# Patient Record
Sex: FEMALE | Race: BLACK OR AFRICAN AMERICAN | Employment: STUDENT | URBAN - METROPOLITAN AREA
[De-identification: names, ages, dates, MRNs, and addresses within clinical notes are randomized per-mention and may not be internally consistent; named-entity substitution may affect disease eponyms.]

---

## 2019-01-25 ENCOUNTER — HOSPITAL ENCOUNTER (OUTPATIENT)
Dept: LAB | Age: 19
Discharge: HOME | End: 2019-01-25
Payer: COMMERCIAL

## 2019-01-25 ENCOUNTER — OFFICE VISIT (OUTPATIENT)
Dept: FAMILY MEDICINE CLINIC | Age: 19
End: 2019-01-25

## 2019-01-25 VITALS
WEIGHT: 196 LBS | TEMPERATURE: 97.2 F | OXYGEN SATURATION: 99 % | BODY MASS INDEX: 31.5 KG/M2 | HEIGHT: 66 IN | DIASTOLIC BLOOD PRESSURE: 83 MMHG | RESPIRATION RATE: 16 BRPM | HEART RATE: 79 BPM | SYSTOLIC BLOOD PRESSURE: 119 MMHG

## 2019-01-25 DIAGNOSIS — R10.13 EPIGASTRIC ABDOMINAL PAIN: Primary | ICD-10-CM

## 2019-01-25 DIAGNOSIS — R10.13 EPIGASTRIC ABDOMINAL PAIN: ICD-10-CM

## 2019-01-25 LAB
ALBUMIN SERPL-MCNC: 4.2 G/DL (ref 3.4–5)
ALBUMIN/GLOB SERPL: 1.1 {RATIO} (ref 0.8–1.7)
ALP SERPL-CCNC: 48 U/L (ref 45–117)
ALT SERPL-CCNC: 19 U/L (ref 13–56)
AMYLASE SERPL-CCNC: 122 U/L (ref 25–115)
ANION GAP SERPL CALC-SCNC: 6 MMOL/L (ref 3–18)
AST SERPL-CCNC: 14 U/L (ref 15–37)
BILIRUB SERPL-MCNC: 0.5 MG/DL (ref 0.2–1)
BUN SERPL-MCNC: 12 MG/DL (ref 7–18)
BUN/CREAT SERPL: 13 (ref 12–20)
CALCIUM SERPL-MCNC: 9.3 MG/DL (ref 8.5–10.1)
CHLORIDE SERPL-SCNC: 101 MMOL/L (ref 100–108)
CO2 SERPL-SCNC: 32 MMOL/L (ref 21–32)
CREAT SERPL-MCNC: 0.94 MG/DL (ref 0.6–1.3)
GLOBULIN SER CALC-MCNC: 4 G/DL (ref 2–4)
GLUCOSE SERPL-MCNC: 95 MG/DL (ref 74–99)
LIPASE SERPL-CCNC: 411 U/L (ref 73–393)
POTASSIUM SERPL-SCNC: 3.9 MMOL/L (ref 3.5–5.5)
PROT SERPL-MCNC: 8.2 G/DL (ref 6.4–8.2)
SODIUM SERPL-SCNC: 139 MMOL/L (ref 136–145)

## 2019-01-25 PROCEDURE — 83013 H PYLORI (C-13) BREATH: CPT

## 2019-01-25 PROCEDURE — 83690 ASSAY OF LIPASE: CPT

## 2019-01-25 PROCEDURE — 80053 COMPREHEN METABOLIC PANEL: CPT

## 2019-01-25 PROCEDURE — 82150 ASSAY OF AMYLASE: CPT

## 2019-01-25 RX ORDER — BUPROPION HYDROCHLORIDE 150 MG/1
TABLET ORAL
Refills: 3 | COMMUNITY
Start: 2019-01-04

## 2019-01-25 RX ORDER — PANTOPRAZOLE SODIUM 40 MG/1
40 TABLET, DELAYED RELEASE ORAL DAILY
Qty: 30 TAB | Refills: 1 | Status: SHIPPED | OUTPATIENT
Start: 2019-01-25 | End: 2019-02-13 | Stop reason: ALTCHOICE

## 2019-01-25 NOTE — PROGRESS NOTES
HISTORY OF PRESENT ILLNESS  Haleigh Kurtz is a 19 y.o. female. HPI  1) Ms. Babatunde Griggs presents as a new patient for epigastric abdominal pain, present x 2 months. Pain is constant but worse with hunger. Eating does not tend to affect the pain. No OTC therapies tried for the pain. - She takes OTC garcinia-cambogia for use as an appetite-suppressant over the past year. -  LMP: 1/12/19     2) Hx depression - follows with La Nena Maki of [facility]. She has been on the Wellbutrin XL for ~7 months, doing well. Review of Systems   Gastrointestinal: Positive for abdominal pain, constipation and nausea. Negative for blood in stool, diarrhea, heartburn and vomiting. Psychiatric/Behavioral: Positive for depression (controlled). Visit Vitals  /83 (BP 1 Location: Right arm, BP Patient Position: Sitting)   Pulse 79   Temp 97.2 °F (36.2 °C) (Oral)   Resp 16   Ht 5' 6\" (1.676 m)   Wt 196 lb (88.9 kg)   LMP 01/12/2019 (Exact Date)   SpO2 99%   BMI 31.64 kg/m²       Physical Exam   Constitutional: She is oriented to person, place, and time. She appears well-developed and well-nourished. No distress. HENT:   Head: Normocephalic and atraumatic. Right Ear: Tympanic membrane, external ear and ear canal normal.   Left Ear: Tympanic membrane, external ear and ear canal normal.   Nose: Nose normal.   Mouth/Throat: Uvula is midline, oropharynx is clear and moist and mucous membranes are normal. No oropharyngeal exudate, posterior oropharyngeal edema, posterior oropharyngeal erythema or tonsillar abscesses. Eyes: Conjunctivae are normal. Pupils are equal, round, and reactive to light. No scleral icterus. Neck: Neck supple. Cardiovascular: Normal rate, regular rhythm and normal heart sounds. Exam reveals no gallop. No murmur heard. Pulses:       Dorsalis pedis pulses are 2+ on the right side, and 2+ on the left side.         Posterior tibial pulses are 2+ on the right side, and 2+ on the left side.   No pedal edema. Pulmonary/Chest: Effort normal and breath sounds normal. No respiratory distress. She has no decreased breath sounds. She has no wheezes. She has no rhonchi. She has no rales. Abdominal: Soft. Normal appearance and bowel sounds are normal. She exhibits no distension and no mass. There is no splenomegaly or hepatomegaly. There is tenderness (primarily epigastrum) in the right upper quadrant, epigastric area and left upper quadrant. There is no rigidity, no rebound, no guarding and negative Mathews's sign. Lymphadenopathy:        Head (right side): No submandibular and no tonsillar adenopathy present. Head (left side): No submandibular and no tonsillar adenopathy present. She has no cervical adenopathy. Right: No supraclavicular adenopathy present. Left: No supraclavicular adenopathy present. Neurological: She is alert and oriented to person, place, and time. Skin: Skin is warm and dry. Psychiatric: She has a normal mood and affect. Her speech is normal.       ASSESSMENT and PLAN  Orders Placed This Encounter    AMYLASE     Standing Status:   Future     Standing Expiration Date:   1/26/2020    LIPASE     Standing Status:   Future     Standing Expiration Date:   1/26/2020    H. PYLORI BREATH TEST     Standing Status:   Future     Standing Expiration Date:   3/13/3320    METABOLIC PANEL, COMPREHENSIVE     Standing Status:   Future     Standing Expiration Date:   1/26/2020    pantoprazole (PROTONIX) 40 mg tablet     Sig: Take 1 Tab by mouth daily. Dispense:  30 Tab     Refill:  1     Diagnoses and all orders for this visit:    1. Epigastric abdominal pain  -     AMYLASE; Future  -     LIPASE; Future  -     H. PYLORI BREATH TEST; Future  -     METABOLIC PANEL, COMPREHENSIVE; Future  -     pantoprazole (PROTONIX) 40 mg tablet; Take 1 Tab by mouth daily.  - Trial of PPI therapy x 1 month. If no improvement of pain, consider US gallbladder.        Follow-up Disposition:  Return in about 4 weeks (around 2/22/2019) for follow up abdominal pain. patient advised to come in sooner if pain worsens on medication.

## 2019-01-25 NOTE — PROGRESS NOTES
Rm 7  Patient presents to the clinic c/o abdominal pain on RUQ x a couple of months. Does patient want flu shot? No,already received. Depression Screening:  PHQ over the last two weeks 1/25/2019   Little interest or pleasure in doing things Not at all   Feeling down, depressed, irritable, or hopeless More than half the days   Total Score PHQ 2 2       Learning Assessment:  Learning Assessment 1/25/2019   PRIMARY LEARNER Patient   HIGHEST LEVEL OF EDUCATION - PRIMARY LEARNER  SOME COLLEGE   BARRIERS PRIMARY LEARNER NONE   CO-LEARNER CAREGIVER No   PRIMARY LANGUAGE ENGLISH   LEARNER PREFERENCE PRIMARY DEMONSTRATION   ANSWERED BY pateint   RELATIONSHIP SELF       Abuse Screening:  No flowsheet data found. Health Maintenance reviewed and discussed per provider: yes     Coordination of Care:    1. Have you been to the ER, urgent care clinic since your last visit? Hospitalized since your last visit? no    2. Have you seen or consulted any other health care providers outside of the [address] [place] [address] since your last visit? Include any pap smears or colon screening.  No

## 2019-01-27 LAB — UREA BREATH TEST QL: NEGATIVE

## 2019-02-13 ENCOUNTER — OFFICE VISIT (OUTPATIENT)
Dept: FAMILY MEDICINE CLINIC | Age: 19
End: 2019-02-13

## 2019-02-13 VITALS
HEART RATE: 95 BPM | HEIGHT: 66 IN | DIASTOLIC BLOOD PRESSURE: 74 MMHG | TEMPERATURE: 97.9 F | RESPIRATION RATE: 20 BRPM | OXYGEN SATURATION: 98 % | BODY MASS INDEX: 29.83 KG/M2 | SYSTOLIC BLOOD PRESSURE: 123 MMHG | WEIGHT: 185.6 LBS

## 2019-02-13 DIAGNOSIS — R10.13 EPIGASTRIC ABDOMINAL PAIN: ICD-10-CM

## 2019-02-13 DIAGNOSIS — R10.11 RUQ ABDOMINAL PAIN: Primary | ICD-10-CM

## 2019-02-13 RX ORDER — RANITIDINE 150 MG/1
150 TABLET, FILM COATED ORAL 2 TIMES DAILY
Qty: 60 TAB | Refills: 0 | Status: SHIPPED | OUTPATIENT
Start: 2019-02-13 | End: 2019-03-15 | Stop reason: SDUPTHER

## 2019-02-13 NOTE — PROGRESS NOTES
Rm 7  Patient presents to the clinic for a 4 week follow-up of abdominal pain. Patient stated after taking Protonix threw up and has hd constipation so stopped taking medication, but still does have abdominal pain. Depression Screening:  3 most recent PHQ Screens 2/13/2019 1/25/2019   Little interest or pleasure in doing things Not at all Not at all   Feeling down, depressed, irritable, or hopeless More than half the days More than half the days   Total Score PHQ 2 2 2       Learning Assessment:  Learning Assessment 1/25/2019   PRIMARY LEARNER Patient   HIGHEST LEVEL OF EDUCATION - PRIMARY LEARNER  SOME COLLEGE   BARRIERS PRIMARY LEARNER NONE   CO-LEARNER CAREGIVER No   PRIMARY LANGUAGE ENGLISH   LEARNER PREFERENCE PRIMARY DEMONSTRATION   ANSWERED BY pateint   RELATIONSHIP SELF       Abuse Screening:  No flowsheet data found. Health Maintenance reviewed and discussed per provider: yes     Coordination of Care:    1. Have you been to the ER, urgent care clinic since your last visit? Hospitalized since your last visit? no    2. Have you seen or consulted any other health care providers outside of the [address] [place] [address] since your last visit? Include any pap smears or colon screening.  No

## 2019-02-13 NOTE — PROGRESS NOTES
HISTORY OF PRESENT ILLNESS  Ruben Hobson is a 19 y.o. female. HPI  Ms. Blanchard Kussmaul presents for follow up epigastric and RUQ abdominal pain. She c/o N/V with the Protonix, so she discontinued this medication. She has improved her diet and actually lost 11 lbs since her last visit, but she c/o continued abdominal pain. She continues to feel pain is worse with hunger, and she admits that fattier meals also tend to trigger her pain. She also c/o constipation this week. Review of Systems   Gastrointestinal: Positive for constipation. Visit Vitals  /74 (BP 1 Location: Right arm, BP Patient Position: Sitting)   Pulse 95   Temp 97.9 °F (36.6 °C) (Oral)   Resp 20   Ht 5' 6\" (1.676 m)   Wt 185 lb 9.6 oz (84.2 kg)   LMP 01/14/2019 (Exact Date)   SpO2 98%   BMI 29.96 kg/m²     Patient's last menstrual period was 01/14/2019 (exact date). Physical Exam   Constitutional: She is oriented to person, place, and time. She appears well-developed and well-nourished. No distress. HENT:   Head: Normocephalic and atraumatic. Right Ear: Tympanic membrane, external ear and ear canal normal.   Left Ear: Tympanic membrane, external ear and ear canal normal.   Nose: Nose normal.   Mouth/Throat: Uvula is midline, oropharynx is clear and moist and mucous membranes are normal. No oropharyngeal exudate, posterior oropharyngeal edema, posterior oropharyngeal erythema or tonsillar abscesses. Eyes: Conjunctivae are normal. Pupils are equal, round, and reactive to light. No scleral icterus. Neck: Neck supple. Cardiovascular: Normal rate, regular rhythm and normal heart sounds. Exam reveals no gallop. No murmur heard. Pulses:       Dorsalis pedis pulses are 2+ on the right side, and 2+ on the left side. Posterior tibial pulses are 2+ on the right side, and 2+ on the left side. No pedal edema. Pulmonary/Chest: Effort normal and breath sounds normal. No respiratory distress. She has no decreased breath sounds. She has no wheezes. She has no rhonchi. She has no rales. Abdominal: Soft. Normal appearance and bowel sounds are normal. She exhibits no distension. There is tenderness in the right upper quadrant and epigastric area. There is no rigidity, no rebound, no guarding and negative Mathews's sign. Lymphadenopathy:        Head (right side): No submandibular and no tonsillar adenopathy present. Head (left side): No submandibular and no tonsillar adenopathy present. She has no cervical adenopathy. Right: No supraclavicular adenopathy present. Left: No supraclavicular adenopathy present. Neurological: She is alert and oriented to person, place, and time. Skin: Skin is warm and dry. Psychiatric: She has a normal mood and affect. Her speech is normal.       ASSESSMENT and PLAN  Orders Placed This Encounter    US GALLBLADDER     Standing Status:   Future     Standing Expiration Date:   3/13/2020     Order Specific Question:   Is Patient Pregnant? Answer:   No     Order Specific Question:   Reason for Exam     Answer:   RUQ abdominal pain    NM HEPATOBILIARY DUCT SCAN     With ejection fraction     Standing Status:   Future     Standing Expiration Date:   3/13/2020     Scheduling Instructions: With ejection fraction     Order Specific Question:   Is Patient Pregnant? Answer:   No    raNITIdine (ZANTAC) 150 mg tablet     Sig: Take 1 Tab by mouth two (2) times a day. Dispense:  60 Tab     Refill:  0     Diagnoses and all orders for this visit:    1. RUQ abdominal pain  -     US GALLBLADDER; Future  -     NM HEPATOBILIARY DUCT SCAN; Future    2. Epigastric abdominal pain  -     raNITIdine (ZANTAC) 150 mg tablet; Take 1 Tab by mouth two (2) times a day. Follow-up Disposition:  Return for follow-up results.

## 2019-03-15 DIAGNOSIS — R10.13 EPIGASTRIC ABDOMINAL PAIN: ICD-10-CM

## 2019-03-18 RX ORDER — RANITIDINE 150 MG/1
TABLET, FILM COATED ORAL
Qty: 60 TAB | Refills: 0 | Status: SHIPPED | OUTPATIENT
Start: 2019-03-18

## 2019-03-25 ENCOUNTER — HOSPITAL ENCOUNTER (OUTPATIENT)
Dept: NUCLEAR MEDICINE | Age: 19
Discharge: HOME | End: 2019-03-25
Attending: PHYSICIAN ASSISTANT
Payer: COMMERCIAL

## 2019-03-25 ENCOUNTER — HOSPITAL ENCOUNTER (OUTPATIENT)
Dept: ULTRASOUND IMAGING | Age: 19
Discharge: HOME | End: 2019-03-25
Attending: PHYSICIAN ASSISTANT
Payer: COMMERCIAL

## 2019-03-25 VITALS — BODY MASS INDEX: 28.41 KG/M2 | WEIGHT: 176 LBS

## 2019-03-25 DIAGNOSIS — R10.11 RUQ ABDOMINAL PAIN: ICD-10-CM

## 2019-03-25 DIAGNOSIS — R10.11 RUQ PAIN: ICD-10-CM

## 2019-03-25 DIAGNOSIS — K82.8 BILIARY DYSKINESIA: Primary | ICD-10-CM

## 2019-03-25 PROCEDURE — 76705 ECHO EXAM OF ABDOMEN: CPT

## 2019-03-25 PROCEDURE — 78227 HEPATOBIL SYST IMAGE W/DRUG: CPT

## 2019-03-25 PROCEDURE — 74011250636 HC RX REV CODE- 250/636: Performed by: PHYSICIAN ASSISTANT

## 2019-03-25 RX ADMIN — SINCALIDE 5 MCG: 5 INJECTION, POWDER, LYOPHILIZED, FOR SOLUTION INTRAVENOUS at 10:09

## 2019-03-25 NOTE — PROGRESS NOTES
Please notify Ms. David Servant that her gallbladder US was normal, but the other scan showed a mild decrease in the function of her gallbladder. I have referred her to a surgeon to get a recommendation on this.

## 2019-03-25 NOTE — PROGRESS NOTES
Called patient to inform her PA Candy Hernandez reviewed her ultrasound results and it indicated it was normal but there is mild decrease in the function of the gallbladder. Patient was advised a referral was placed to the surgeon for further recommendation. Patient verbalized understanding and had no further questions.

## 2019-03-25 NOTE — PROGRESS NOTES
Please notify Ms. Leela Betancourt that her gallbladder US was normal, but the other scan showed a mild decrease in the function of her gallbladder. I have referred her to a surgeon to get a recommendation on this.

## 2019-04-03 ENCOUNTER — OFFICE VISIT (OUTPATIENT)
Dept: SURGERY | Age: 19
End: 2019-04-03

## 2019-04-03 VITALS
BODY MASS INDEX: 30.37 KG/M2 | DIASTOLIC BLOOD PRESSURE: 91 MMHG | HEART RATE: 87 BPM | HEIGHT: 66 IN | SYSTOLIC BLOOD PRESSURE: 140 MMHG | WEIGHT: 189 LBS | OXYGEN SATURATION: 100 % | TEMPERATURE: 96.8 F

## 2019-04-03 DIAGNOSIS — R10.11 RIGHT UPPER QUADRANT ABDOMINAL PAIN: ICD-10-CM

## 2019-04-03 DIAGNOSIS — K82.8 BILIARY DYSKINESIA: Primary | ICD-10-CM

## 2019-04-03 NOTE — PROGRESS NOTES
General Surgery Consult Rafael Cooper  Admit date: (Not on file) MRN: [id number]     : 2000     Age: 19 y.o. Attending Physician: Tita Velez MD, FACS Subjective:  
 
Nora is a 19 y.o. female with a history of abdominal pain. The patient has stated that she is been having some right upper quadrant abdominal pain for the last few months. She said it probably started last December. She has clearly noticed that the pain happened after eating fatty food so she is trying to adjust her diet to decrease the episodes of pain. She also stated that she had some mild nausea but no vomiting. She said that the right upper quadrant pain is dull and intermittent. She denies any change in bowel habits. She had an ultrasound that was negative for any pathology including no evidence of cholelithiasis. She had a HIDA scan that did not show any evidence of acute cholecystitis but showed a mild biliary dyskinesia with ejection fraction of 31%. The patient also stated that the pain she had during the HIDA scan was exactly similar to the pain she usually have after eating fatty food. She denies any previous abdominal surgeries. The patient  has not had jaundice, acholic stools or dark urine and has not had a history of pancreatitis or hepatitis. There are no active problems to display for this patient. Past Medical History:  
Diagnosis Date  Depression No past surgical history on file. Social History Tobacco Use  Smoking status: Never Smoker  Smokeless tobacco: Current User Substance Use Topics  Alcohol use: Yes Comment: oc  
  
Social History Tobacco Use Smoking Status Never Smoker Smokeless Tobacco Current User Family History Problem Relation Age of Onset  No Known Problems Mother  No Known Problems Father Current Outpatient Medications Medication Sig  
 buPROPion XL (WELLBUTRIN XL) 150 mg tablet TAKE 1 TABLET BY MOUTH EVERY DAY  
  COLLAGEN by Does Not Apply route.  chrom carroll/brindal berry (GARCINIA CAMBOGIA PO) Take  by mouth.  raNITIdine (ZANTAC) 150 mg tablet TAKE 1 TABLET BY MOUTH TWICE A DAY No current facility-administered medications for this visit. No Known Allergies Review of Systems: 
Constitutional: negative Eyes: negative Ears, Nose, Mouth, Throat, and Face: negative Respiratory: negative Cardiovascular: negative Gastrointestinal: positive for nausea and abdominal pain Genitourinary:negative Integument/Breast: negative Hematologic/Lymphatic: negative Musculoskeletal:negative Neurological: negative Behavioral/Psychiatric: negative Endocrine: negative Allergic/Immunologic: negative Objective: There were no vitals taken for this visit. Physical Exam:   
 
General:  in no apparent distress, alert, oriented times 3, anicteric and cooperative Eyes:  conjunctivae and sclerae normal, pupils equal, round, reactive to light Throat & Neck: no erythema or exudates noted and neck supple and symmetrical; no palpable masses Lungs:   clear to auscultation bilaterally Heart:  Regular rate and rhythm Abdomen:   rounded, soft, nontender, nondistended, no masses or organomegaly. No evidence of abdominal wall hernias. Extremities: extremities normal, atraumatic, no cyanosis or edema Skin: Normal.  
   
 
Imaging and Lab Review: CBC: No results found for: WBC, RBC, HGB, HCT, PLT, HGBEXT, HCTEXT, PLTEXT 
BMP:  
Lab Results Component Value Date/Time Glucose 95 01/25/2019 03:07 PM  
 Sodium 139 01/25/2019 03:07 PM  
 Potassium 3.9 01/25/2019 03:07 PM  
 Chloride 101 01/25/2019 03:07 PM  
 CO2 32 01/25/2019 03:07 PM  
 BUN 12 01/25/2019 03:07 PM  
 Creatinine 0.94 01/25/2019 03:07 PM  
 Calcium 9.3 01/25/2019 03:07 PM  
 
CMP: 
Lab Results Component Value Date/Time  Glucose 95 01/25/2019 03:07 PM  
 Sodium 139 01/25/2019 03:07 PM  
 Potassium 3.9 01/25/2019 03:07 PM  
 Chloride 101 01/25/2019 03:07 PM  
 CO2 32 01/25/2019 03:07 PM  
 BUN 12 01/25/2019 03:07 PM  
 Creatinine 0.94 01/25/2019 03:07 PM  
 Calcium 9.3 01/25/2019 03:07 PM  
 Anion gap 6 01/25/2019 03:07 PM  
 BUN/Creatinine ratio 13 01/25/2019 03:07 PM  
 Alk. phosphatase 48 01/25/2019 03:07 PM  
 Protein, total 8.2 01/25/2019 03:07 PM  
 Albumin 4.2 01/25/2019 03:07 PM  
 Globulin 4.0 01/25/2019 03:07 PM  
 A-G Ratio 1.1 01/25/2019 03:07 PM  
 
 
No results found for this or any previous visit (from the past 24 hour(s)). images and reports reviewed Assessment:  
Celina Yun is a 19 y.o. female is presenting with abdominal pain and a picture of biliary dyskinesia. The patient's symptoms are typical of biliary colic with right upper quadrant pain especially after eating fatty food. However I explained to the patient that her ultrasound is negative for cholelithiasis and her HIDA scan is barely positive for biliary dyskinesia with ejection fraction of 31% size explained to the patient that the clearly there is an indication for the cholecystectomy, but there is a small chance that her symptoms are not related to her gallbladder disease. I explained the indications for robotic cholecystectomy as well as the alternatives. I discussed the potential risks, including but not limited to bleeding, wound infection, trocar injuries, bile duct injury and leak, and also the possible need for conversion to open procedure. I also explained the firefly technology and how it allow us to visualize the biliary tree to avoid bile duct injury or leak. I did explain to the patient that the best option is to proceed with the surgery however she would like to discuss the option with her mom who had a cholecystectomy in the past before making any decision. She has also stated that she is adjusting her diet to prevent any further attacks. Plan: The patient would like to hold on the surgery for now and discuss her option with her mom Lose weight Follow-up with me as needed Please call me if you have any questions (cell phone: [phone]) Signed By: Prince Benoit MD   
 April 3, 2019

## 2019-04-03 NOTE — PROGRESS NOTES
Stefania Enriquez is a 19 y.o. female (: 2000) presenting to address: Chief Complaint Patient presents with  Gallbladder Attack  
  reduced EF 31% Medication list and allergies have been reviewed with Stefania Enriquez and updated as of today's date. I have gone over all Medical, Surgical and Social History with Stefania Enriquez and updated/added the information accordingly. Pt reports that she has pain in her RUQ and that she was informed of early decrease in function of gallbladder. 1. Have you been to the ER, urgent care clinic since your last visit? Hospitalized since your last visit? No 
 
2. Have you seen or consulted any other health care providers outside of the [address] [place] [address] since your last visit? Include any pap smears or colon screening.  No

## 2023-05-16 RX ORDER — RANITIDINE 150 MG/1
1 TABLET ORAL 2 TIMES DAILY
COMMUNITY
Start: 2019-03-18

## 2023-05-16 RX ORDER — BUPROPION HYDROCHLORIDE 150 MG/1
1 TABLET ORAL DAILY
COMMUNITY
Start: 2019-01-04